# Patient Record
Sex: FEMALE | Race: WHITE | NOT HISPANIC OR LATINO | Employment: UNEMPLOYED | URBAN - METROPOLITAN AREA
[De-identification: names, ages, dates, MRNs, and addresses within clinical notes are randomized per-mention and may not be internally consistent; named-entity substitution may affect disease eponyms.]

---
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
## 2020-01-13 ENCOUNTER — HOSPITAL ENCOUNTER (OUTPATIENT)
Dept: RADIOLOGY | Facility: HOSPITAL | Age: 20
Discharge: HOME | End: 2020-01-13
Attending: NURSE PRACTITIONER
Payer: MEDICAID

## 2020-01-13 DIAGNOSIS — R68.84 JAW PAIN: ICD-10-CM

## 2020-01-13 DIAGNOSIS — R68.84 JAW PAIN: Primary | ICD-10-CM

## 2020-01-13 PROCEDURE — 70100 X-RAY EXAM OF JAW <4VIEWS: CPT | Mod: TC,PO

## 2023-04-06 ENCOUNTER — HOSPITAL ENCOUNTER (EMERGENCY)
Facility: HOSPITAL | Age: 23
Discharge: HOME | End: 2023-04-07
Attending: EMERGENCY MEDICINE
Payer: MEDICAID

## 2023-04-06 DIAGNOSIS — R55 VASOVAGAL SYNCOPE: Primary | ICD-10-CM

## 2023-04-06 DIAGNOSIS — R55 SYNCOPE: ICD-10-CM

## 2023-04-06 LAB
B-HCG UR QL: NEGATIVE
BACTERIA #/AREA URNS HPF: ABNORMAL /HPF
BASOPHILS # BLD AUTO: 0.04 K/UL (ref 0–0.2)
BASOPHILS NFR BLD: 1 % (ref 0–1.9)
BILIRUB UR QL STRIP: NEGATIVE
CLARITY UR: CLEAR
COLOR UR: YELLOW
CTP QC/QA: YES
D DIMER PPP IA.FEU-MCNC: 0.19 MG/L FEU
DIFFERENTIAL METHOD: ABNORMAL
EOSINOPHIL # BLD AUTO: 0.3 K/UL (ref 0–0.5)
EOSINOPHIL NFR BLD: 6.7 % (ref 0–8)
ERYTHROCYTE [DISTWIDTH] IN BLOOD BY AUTOMATED COUNT: 12.4 % (ref 11.5–14.5)
GLUCOSE SERPL-MCNC: 93 MG/DL (ref 70–110)
GLUCOSE UR QL STRIP: NEGATIVE
HCT VFR BLD AUTO: 37.3 % (ref 37–48.5)
HGB BLD-MCNC: 12.4 G/DL (ref 12–16)
HGB UR QL STRIP: ABNORMAL
HYALINE CASTS #/AREA URNS LPF: 25 /LPF
IMM GRANULOCYTES # BLD AUTO: 0.02 K/UL (ref 0–0.04)
IMM GRANULOCYTES NFR BLD AUTO: 0.5 % (ref 0–0.5)
KETONES UR QL STRIP: NEGATIVE
LEUKOCYTE ESTERASE UR QL STRIP: ABNORMAL
LYMPHOCYTES # BLD AUTO: 1.5 K/UL (ref 1–4.8)
LYMPHOCYTES NFR BLD: 35.9 % (ref 18–48)
MCH RBC QN AUTO: 32 PG (ref 27–31)
MCHC RBC AUTO-ENTMCNC: 33.2 G/DL (ref 32–36)
MCV RBC AUTO: 96 FL (ref 82–98)
MICROSCOPIC COMMENT: ABNORMAL
MONOCYTES # BLD AUTO: 0.5 K/UL (ref 0.3–1)
MONOCYTES NFR BLD: 12.9 % (ref 4–15)
NEUTROPHILS # BLD AUTO: 1.8 K/UL (ref 1.8–7.7)
NEUTROPHILS NFR BLD: 43 % (ref 38–73)
NITRITE UR QL STRIP: NEGATIVE
NRBC BLD-RTO: 0 /100 WBC
PH UR STRIP: 6 [PH] (ref 5–8)
PLATELET # BLD AUTO: 109 K/UL (ref 150–450)
PMV BLD AUTO: 10.4 FL (ref 9.2–12.9)
PROT UR QL STRIP: ABNORMAL
RBC # BLD AUTO: 3.87 M/UL (ref 4–5.4)
RBC #/AREA URNS HPF: 2 /HPF (ref 0–4)
SP GR UR STRIP: >1.03 (ref 1–1.03)
SQUAMOUS #/AREA URNS HPF: 16 /HPF
URN SPEC COLLECT METH UR: ABNORMAL
UROBILINOGEN UR STRIP-ACNC: ABNORMAL EU/DL
WBC # BLD AUTO: 4.18 K/UL (ref 3.9–12.7)
WBC #/AREA URNS HPF: 7 /HPF (ref 0–5)

## 2023-04-06 PROCEDURE — 80053 COMPREHEN METABOLIC PANEL: CPT | Performed by: STUDENT IN AN ORGANIZED HEALTH CARE EDUCATION/TRAINING PROGRAM

## 2023-04-06 PROCEDURE — 96360 HYDRATION IV INFUSION INIT: CPT

## 2023-04-06 PROCEDURE — 85025 COMPLETE CBC W/AUTO DIFF WBC: CPT | Performed by: STUDENT IN AN ORGANIZED HEALTH CARE EDUCATION/TRAINING PROGRAM

## 2023-04-06 PROCEDURE — 99285 EMERGENCY DEPT VISIT HI MDM: CPT | Mod: 25

## 2023-04-06 PROCEDURE — 82962 GLUCOSE BLOOD TEST: CPT

## 2023-04-06 PROCEDURE — 83735 ASSAY OF MAGNESIUM: CPT | Performed by: STUDENT IN AN ORGANIZED HEALTH CARE EDUCATION/TRAINING PROGRAM

## 2023-04-06 PROCEDURE — 85379 FIBRIN DEGRADATION QUANT: CPT | Performed by: STUDENT IN AN ORGANIZED HEALTH CARE EDUCATION/TRAINING PROGRAM

## 2023-04-06 PROCEDURE — 93010 EKG 12-LEAD: ICD-10-PCS | Mod: ,,, | Performed by: INTERNAL MEDICINE

## 2023-04-06 PROCEDURE — 93005 ELECTROCARDIOGRAM TRACING: CPT | Performed by: INTERNAL MEDICINE

## 2023-04-06 PROCEDURE — 81025 URINE PREGNANCY TEST: CPT

## 2023-04-06 PROCEDURE — 93010 ELECTROCARDIOGRAM REPORT: CPT | Mod: ,,, | Performed by: INTERNAL MEDICINE

## 2023-04-06 PROCEDURE — 84484 ASSAY OF TROPONIN QUANT: CPT | Performed by: STUDENT IN AN ORGANIZED HEALTH CARE EDUCATION/TRAINING PROGRAM

## 2023-04-06 PROCEDURE — 84443 ASSAY THYROID STIM HORMONE: CPT | Performed by: STUDENT IN AN ORGANIZED HEALTH CARE EDUCATION/TRAINING PROGRAM

## 2023-04-06 PROCEDURE — 81001 URINALYSIS AUTO W/SCOPE: CPT | Performed by: STUDENT IN AN ORGANIZED HEALTH CARE EDUCATION/TRAINING PROGRAM

## 2023-04-06 NOTE — Clinical Note
"Ysabel Farley" Kannan was seen and treated in our emergency department on 4/6/2023.  She may return to school on 04/10/2023.      If you have any questions or concerns, please don't hesitate to call.      Becky Hunter MD"

## 2023-04-07 VITALS
WEIGHT: 115 LBS | OXYGEN SATURATION: 100 % | HEART RATE: 72 BPM | BODY MASS INDEX: 18.56 KG/M2 | SYSTOLIC BLOOD PRESSURE: 109 MMHG | TEMPERATURE: 98 F | RESPIRATION RATE: 16 BRPM | DIASTOLIC BLOOD PRESSURE: 78 MMHG

## 2023-04-07 LAB
ALBUMIN SERPL BCP-MCNC: 3.8 G/DL (ref 3.5–5.2)
ALP SERPL-CCNC: 49 U/L (ref 55–135)
ALT SERPL W/O P-5'-P-CCNC: 12 U/L (ref 10–44)
ANION GAP SERPL CALC-SCNC: 8 MMOL/L (ref 8–16)
AST SERPL-CCNC: 14 U/L (ref 10–40)
BILIRUB SERPL-MCNC: 0.5 MG/DL (ref 0.1–1)
BUN SERPL-MCNC: 15 MG/DL (ref 6–20)
CALCIUM SERPL-MCNC: 8.9 MG/DL (ref 8.7–10.5)
CHLORIDE SERPL-SCNC: 107 MMOL/L (ref 95–110)
CO2 SERPL-SCNC: 25 MMOL/L (ref 23–29)
CREAT SERPL-MCNC: 0.8 MG/DL (ref 0.5–1.4)
EST. GFR  (NO RACE VARIABLE): >60 ML/MIN/1.73 M^2
GLUCOSE SERPL-MCNC: 85 MG/DL (ref 70–110)
MAGNESIUM SERPL-MCNC: 1.9 MG/DL (ref 1.6–2.6)
POTASSIUM SERPL-SCNC: 3.6 MMOL/L (ref 3.5–5.1)
PROT SERPL-MCNC: 7.1 G/DL (ref 6–8.4)
SODIUM SERPL-SCNC: 140 MMOL/L (ref 136–145)
TROPONIN I SERPL HS-MCNC: <2.3 PG/ML (ref 0–14.9)
TSH SERPL DL<=0.005 MIU/L-ACNC: 2.68 UIU/ML (ref 0.34–5.6)

## 2023-04-07 PROCEDURE — 25000003 PHARM REV CODE 250: Performed by: STUDENT IN AN ORGANIZED HEALTH CARE EDUCATION/TRAINING PROGRAM

## 2023-04-07 RX ADMIN — SODIUM CHLORIDE 2000 ML: 9 INJECTION, SOLUTION INTRAVENOUS at 12:04

## 2023-04-07 NOTE — ED PROVIDER NOTES
Encounter Date: 4/6/2023       History     Chief Complaint   Patient presents with    Loss of Consciousness     Syncope at approx noon today, struck forehead on concrete floor. Unknown how long passed out. Found by friend due to hearing fall. Knot on right eyebrow. Had another syncopal episode and shaking after getting into a chair.     HPI    Ms. Ysabel Brunner is a 23 year old female with a past medical history of anxiety and seasonal allergies who presented after a syncopal episode.  Patient reports that she was at [employer] school, in a class where she was on her feet.  She began to feel hot, diaphoretic, lightheaded.  She thought that if she splashed water on her face, she would feel better.  She started to walk to the bathroom in order to do so, but started to get blurry vision.  As she reached the bathroom, she says that she passed out.  Her friend found her on the ground.  Unknown length of down time.  Patient reports that she did hit her head.  They brought her to the school office, where she again had another syncopal episode.  They noted that she had become pale, diaphoretic and clammy.  They gave her a granola bar which she ate, and gave her some water and ice.  She reported feeling better, but having some pain where she had hit her forehead.  Patient denies any prior episodes of syncope.  She reports that sometimes she feels weak and lightheaded, eats a candy and feels better.  She has no known cardiac history.  She reports her mom has an abnormal heartbeat.  She denies any preceding chest pain or palpitations.  Denies any recent illness other than mild congestion. Denies any fevers, chills, vomiting, diarrhea. All other review of systems negative.    Review of patient's allergies indicates:  No Known Allergies  No past medical history on file.  No past surgical history on file.  Family History   Problem Relation Age of Onset    Breast cancer Maternal Grandmother      Social History     Tobacco Use    Smoking  status: Never     Review of Systems   Constitutional:  Positive for diaphoresis. Negative for activity change, appetite change, chills, fatigue and fever.   HENT:  Negative for congestion, rhinorrhea, sneezing and sore throat.    Eyes:  Negative for photophobia, redness and visual disturbance.   Respiratory:  Negative for cough, shortness of breath and wheezing.    Cardiovascular:  Negative for chest pain and leg swelling.   Gastrointestinal:  Negative for abdominal pain, diarrhea, nausea and vomiting.   Genitourinary:  Negative for frequency, hematuria, urgency and vaginal bleeding.   Musculoskeletal:  Negative for arthralgias, back pain and myalgias.   Skin:  Positive for pallor. Negative for color change and wound.   Neurological:  Positive for syncope, weakness and light-headedness. Negative for dizziness, numbness and headaches.   Hematological:  Does not bruise/bleed easily.   Psychiatric/Behavioral:  Negative for agitation, behavioral problems and confusion.    All other systems reviewed and are negative.    Physical Exam     Initial Vitals [04/06/23 2212]   BP Pulse Resp Temp SpO2   114/87 95 18 97.9 °F (36.6 °C) 99 %      MAP       --         Physical Exam    Nursing note and vitals reviewed.  Constitutional: She appears well-developed and well-nourished. She is not diaphoretic. No distress.   23 year old female, alert, oriented, speaking in full sentences   HENT:   Head: Normocephalic and atraumatic.   Right Ear: External ear normal.   Left Ear: External ear normal.   Nose: Nose normal.   Mouth/Throat: Oropharynx is clear and moist.   Eyes: Conjunctivae and EOM are normal. Pupils are equal, round, and reactive to light.   Neck: Neck supple.   Normal range of motion.  Cardiovascular:  Normal rate, regular rhythm, normal heart sounds and intact distal pulses.           No murmur heard.  Pulmonary/Chest: Breath sounds normal. No respiratory distress. She has no wheezes. She exhibits no tenderness.    Abdominal: Abdomen is soft. Bowel sounds are normal. She exhibits no distension. There is no abdominal tenderness. There is no guarding.   Musculoskeletal:         General: No tenderness or edema. Normal range of motion.      Cervical back: Normal range of motion and neck supple.     Neurological: She is alert and oriented to person, place, and time. She has normal strength. No cranial nerve deficit or sensory deficit. GCS score is 15. GCS eye subscore is 4. GCS verbal subscore is 5. GCS motor subscore is 6.   Skin: Skin is warm and dry. Capillary refill takes less than 2 seconds. No erythema. No pallor.   Psychiatric: She has a normal mood and affect. Thought content normal.       ED Course   Procedures  Labs Reviewed   CBC W/ AUTO DIFFERENTIAL - Abnormal; Notable for the following components:       Result Value    RBC 3.87 (*)     MCH 32.0 (*)     Platelets 109 (*)     All other components within normal limits   COMPREHENSIVE METABOLIC PANEL - Abnormal; Notable for the following components:    Alkaline Phosphatase 49 (*)     All other components within normal limits   URINALYSIS, REFLEX TO URINE CULTURE - Abnormal; Notable for the following components:    Specific Gravity, UA >1.030 (*)     Protein, UA 1+ (*)     Occult Blood UA Trace (*)     Urobilinogen, UA 4.0-6.0 (*)     Leukocytes, UA Trace (*)     All other components within normal limits    Narrative:     Specimen Source->Urine   URINALYSIS MICROSCOPIC - Abnormal; Notable for the following components:    WBC, UA 7 (*)     Hyaline Casts, UA 25 (*)     All other components within normal limits    Narrative:     Specimen Source->Urine   TROPONIN I HIGH SENSITIVITY   TSH   D DIMER, QUANTITATIVE   MAGNESIUM   POCT URINE PREGNANCY   POCT GLUCOSE          Imaging Results              X-Ray Chest AP Portable (In process)                      CT Head Without Contrast (Final result)  Result time 04/06/23 23:52:19      Final result by Jane Lake MD (04/06/23  23:52:19)                   Narrative:    EXAM DESCRIPTION:    CT HEAD WITHOUT CONTRAST    CLINICAL HISTORY:  Syncope, recurrent    TECHNIQUE:    5mm slice thickness axial images through the brain were performed in bone and soft tissue windows in the absence of intravenous contrast.  This exam was performed according to our departmental dose-optimization program which includes use of Automated Exposure Control, adjustment of the mA and/or kV according to patient size and/or use of iterative reconstruction technique.    COMPARISON:    None.    FINDINGS:    The brain parenchyma appears unremarkable. There is no intra-axial or extra-axial bleed seen. There is no mass or mass effect. The ventricles are normal in size shape and configuration. The orbital contents appear unremarkable.    The mastoid air cells are patent. No fracture is present. Mild mucosal thickening in the maxillary sinuses, nonspecific.    IMPRESSION:  No acute intracranial pathology.    Electronically signed by:  Jane Augustine MD, TULIO  4/6/2023 11:52 PM CDT Workstation: [id number]                                     Medications   sodium chloride 0.9% bolus 2,000 mL 2,000 mL (0 mLs Intravenous Stopped 4/7/23 0100)     Medical Decision Making:   History:   Old Medical Records: I decided to obtain old medical records.  Old Records Summarized: records from clinic visits and records from previous admission(s).  Initial Assessment:   23 year old female with a past medical history of anxiety and seasonal allergies who presented after a syncopal episodes.  Patient was at work, had syncopal episode while standing, after feeling hot and lightheaded.  Hit head, unknown length of loss consciousness.  Had 2nd syncopal episode immediately following, with pallor and clamminess per witnesses.  Patient arrives afebrile with stable vitals.  Well-appearing 23-year-old female, not in acute distress.  Alert, oriented, speaking in full sentences.  Benign physical exam.   Neurologic exam with no focal deficits.  Differential Diagnosis:   Vasovagal syncope, cardiac arrhythmia, anxiety, dehydration  Clinical Tests:   Lab Tests: Ordered and Reviewed  Radiological Study: Ordered and Reviewed  Medical Tests: Ordered and Reviewed  ED Management:  EKG normal sinus rhythm, no ischemic changes.  CBC and CMP grossly within normal limits.  D-dimer negative.  Troponin negative.  UPT negative.  Urinalysis with 7 white blood cells and leukocyte esterase, will discuss urinary symptoms.  CT head negative for acute abnormality.  Pending chest x-ray.  Patient given 2 L of fluids.  We will obtain orthostatic vital signs.  We will continue to reassess and update.     Update: CXR with no acute abnormality. Patient with no urinary symptoms at this time. Will follow urine culture. Patient reports she feels well after fluids and eating food. Orthostatic vitals normal. Discharged at this time, strict return precautions discussed. Advised primary care follow-up. Discussed staying hydrated and keeping blood sugar up.     Becky Hunter, PGY4  [facility]          Attending Attestation:   Physician Attestation Statement for Resident:  As the supervising MD   I agree with the above history.  -:   As the supervising MD I agree with the above PE.     As the supervising MD I agree with the above treatment, course, plan, and disposition.    I have reviewed and agree with the residents interpretation of the following: lab data, x-rays, CT scans and EKG.               ED Course as of 04/07/23 0310   Thu Apr 06, 2023   2359 Leukocytes, UA(!): Trace [HM]   2359 WBC, UA(!): 7 [HM]      ED Course User Index  [HM] Becky Hunter MD                 Clinical Impression:   Final diagnoses:  [R55] Syncope  [R55] Vasovagal syncope (Primary)        ED Disposition Condition    Discharge Stable          ED Prescriptions    None       Follow-up Information       Follow up With Specialties Details Why Contact Info  Additional Information    [facility] - Emergency Dept Emergency Medicine Go to  As needed, If symptoms worsen [address] [facility] [id number] [phone] 1st floor    Farhat Win NP Family Medicine Schedule an appointment as soon as possible for a visit in 3 days For follow-up appointment [address] [facility] [id number]  [phone]                Becky Hunter MD  Resident  04/07/23 0136       Rainer Dotson MD  04/07/23 0311

## 2023-04-07 NOTE — ED NOTES
Lying:      BP:  108/67  HR:86    Sitting:     BP: 113/73   HR:80    Standing: BP: 114/75   HR: 88

## 2023-04-07 NOTE — DISCHARGE INSTRUCTIONS
Please return to the ER immediately for any worsening or concerning symptoms.  These include weakness, lightheadedness, fatigue, further episodes of syncope/passing out, chest pain, palpitations.  Please schedule an appointment with a primary care physician for further evaluation.    Recent Results (from the past 6 hour(s))   POCT glucose    Collection Time: 04/06/23 10:15 PM   Result Value Ref Range    POC Glucose 93 70 - 110   POCT urine pregnancy    Collection Time: 04/06/23 10:31 PM   Result Value Ref Range    POC Preg Test, Ur Negative Negative     Acceptable Yes    CBC auto differential    Collection Time: 04/06/23 11:25 PM   Result Value Ref Range    WBC 4.18 3.90 - 12.70 K/uL    RBC 3.87 (L) 4.00 - 5.40 M/uL    Hemoglobin 12.4 12.0 - 16.0 g/dL    Hematocrit 37.3 37.0 - 48.5 %    MCV 96 82 - 98 fL    MCH 32.0 (H) 27.0 - 31.0 pg    MCHC 33.2 32.0 - 36.0 g/dL    RDW 12.4 11.5 - 14.5 %    Platelets 109 (L) 150 - 450 K/uL    MPV 10.4 9.2 - 12.9 fL    Immature Granulocytes 0.5 0.0 - 0.5 %    Gran # (ANC) 1.8 1.8 - 7.7 K/uL    Immature Grans (Abs) 0.02 0.00 - 0.04 K/uL    Lymph # 1.5 1.0 - 4.8 K/uL    Mono # 0.5 0.3 - 1.0 K/uL    Eos # 0.3 0.0 - 0.5 K/uL    Baso # 0.04 0.00 - 0.20 K/uL    nRBC 0 0 /100 WBC    Gran % 43.0 38.0 - 73.0 %    Lymph % 35.9 18.0 - 48.0 %    Mono % 12.9 4.0 - 15.0 %    Eosinophil % 6.7 0.0 - 8.0 %    Basophil % 1.0 0.0 - 1.9 %    Differential Method Automated    Comprehensive metabolic panel    Collection Time: 04/06/23 11:25 PM   Result Value Ref Range    Sodium 140 136 - 145 mmol/L    Potassium 3.6 3.5 - 5.1 mmol/L    Chloride 107 95 - 110 mmol/L    CO2 25 23 - 29 mmol/L    Glucose 85 70 - 110 mg/dL    BUN 15 6 - 20 mg/dL    Creatinine 0.8 0.5 - 1.4 mg/dL    Calcium 8.9 8.7 - 10.5 mg/dL    Total Protein 7.1 6.0 - 8.4 g/dL    Albumin 3.8 3.5 - 5.2 g/dL    Total Bilirubin 0.5 0.1 - 1.0 mg/dL    Alkaline Phosphatase 49 (L) 55 - 135 U/L    AST 14 10 - 40 U/L    ALT 12 10 - 44  U/L    Anion Gap 8 8 - 16 mmol/L    eGFR >60.0 >60 mL/min/1.73 m^2   Troponin I High Sensitivity    Collection Time: 04/06/23 11:25 PM   Result Value Ref Range    Troponin I High Sensitivity <2.3 0.0 - 14.9 pg/mL   TSH    Collection Time: 04/06/23 11:25 PM   Result Value Ref Range    TSH 2.680 0.340 - 5.600 uIU/mL   D dimer, quantitative    Collection Time: 04/06/23 11:25 PM   Result Value Ref Range    D-Dimer 0.19 <0.50 mg/L FEU   Magnesium    Collection Time: 04/06/23 11:25 PM   Result Value Ref Range    Magnesium 1.9 1.6 - 2.6 mg/dL   Urinalysis, Reflex to Urine Culture Urine, Clean Catch    Collection Time: 04/06/23 11:31 PM    Specimen: Urine, Clean Catch   Result Value Ref Range    Specimen UA Urine, Clean Catch     Color, UA Yellow Yellow, Straw, Randee    Appearance, UA Clear Clear    pH, UA 6.0 5.0 - 8.0    Specific Gravity, UA >1.030 (A) 1.005 - 1.030    Protein, UA 1+ (A) Negative    Glucose, UA Negative Negative    Ketones, UA Negative Negative    Bilirubin (UA) Negative Negative    Occult Blood UA Trace (A) Negative    Nitrite, UA Negative Negative    Urobilinogen, UA 4.0-6.0 (A) Negative EU/dL    Leukocytes, UA Trace (A) Negative   Urinalysis Microscopic    Collection Time: 04/06/23 11:31 PM   Result Value Ref Range    RBC, UA 2 0 - 4 /hpf    WBC, UA 7 (H) 0 - 5 /hpf    Bacteria Rare None-Occ /hpf    Squam Epithel, UA 16 /hpf    Hyaline Casts, UA 25 (A) 0-1/lpf /lpf    Microscopic Comment SEE COMMENT      Imaging Results              X-Ray Chest AP Portable (In process)                      CT Head Without Contrast (Final result)  Result time 04/06/23 23:52:19      Final result by Jane Lake MD (04/06/23 23:52:19)                   Narrative:    EXAM DESCRIPTION:    CT HEAD WITHOUT CONTRAST    CLINICAL HISTORY:  Syncope, recurrent    TECHNIQUE:    5mm slice thickness axial images through the brain were performed in bone and soft tissue windows in the absence of intravenous contrast.  This exam was  performed according to our departmental dose-optimization program which includes use of Automated Exposure Control, adjustment of the mA and/or kV according to patient size and/or use of iterative reconstruction technique.    COMPARISON:    None.    FINDINGS:    The brain parenchyma appears unremarkable. There is no intra-axial or extra-axial bleed seen. There is no mass or mass effect. The ventricles are normal in size shape and configuration. The orbital contents appear unremarkable.    The mastoid air cells are patent. No fracture is present. Mild mucosal thickening in the maxillary sinuses, nonspecific.    IMPRESSION:  No acute intracranial pathology.    Electronically signed by:  Jane Augustine MD, [facility]  4/6/2023 11:52 PM CDT Workstation: [id number]